# Patient Record
Sex: FEMALE | HISPANIC OR LATINO | URBAN - METROPOLITAN AREA
[De-identification: names, ages, dates, MRNs, and addresses within clinical notes are randomized per-mention and may not be internally consistent; named-entity substitution may affect disease eponyms.]

---

## 2017-08-25 ENCOUNTER — HOSPITAL ENCOUNTER (OUTPATIENT)
Dept: LAB | Facility: MEDICAL CENTER | Age: 27
End: 2017-08-25
Attending: FAMILY MEDICINE
Payer: COMMERCIAL

## 2017-08-25 LAB
BASOPHILS # BLD AUTO: 0.4 % (ref 0–1.8)
BASOPHILS # BLD: 0.03 K/UL (ref 0–0.12)
EOSINOPHIL # BLD AUTO: 0.35 K/UL (ref 0–0.51)
EOSINOPHIL NFR BLD: 5.2 % (ref 0–6.9)
ERYTHROCYTE [DISTWIDTH] IN BLOOD BY AUTOMATED COUNT: 40.1 FL (ref 35.9–50)
HCT VFR BLD AUTO: 38.7 % (ref 37–47)
HGB BLD-MCNC: 12.6 G/DL (ref 12–16)
IMM GRANULOCYTES # BLD AUTO: 0.02 K/UL (ref 0–0.11)
IMM GRANULOCYTES NFR BLD AUTO: 0.3 % (ref 0–0.9)
LYMPHOCYTES # BLD AUTO: 1.88 K/UL (ref 1–4.8)
LYMPHOCYTES NFR BLD: 28.1 % (ref 22–41)
MCH RBC QN AUTO: 27.6 PG (ref 27–33)
MCHC RBC AUTO-ENTMCNC: 32.6 G/DL (ref 33.6–35)
MCV RBC AUTO: 84.7 FL (ref 81.4–97.8)
MONOCYTES # BLD AUTO: 0.34 K/UL (ref 0–0.85)
MONOCYTES NFR BLD AUTO: 5.1 % (ref 0–13.4)
NEUTROPHILS # BLD AUTO: 4.06 K/UL (ref 2–7.15)
NEUTROPHILS NFR BLD: 60.9 % (ref 44–72)
NRBC # BLD AUTO: 0 K/UL
NRBC BLD AUTO-RTO: 0 /100 WBC
PLATELET # BLD AUTO: 269 K/UL (ref 164–446)
PMV BLD AUTO: 11.7 FL (ref 9–12.9)
RBC # BLD AUTO: 4.57 M/UL (ref 4.2–5.4)
T4 SERPL-MCNC: 8 UG/DL (ref 4–12)
TSH SERPL DL<=0.005 MIU/L-ACNC: 2.02 UIU/ML (ref 0.3–3.7)
WBC # BLD AUTO: 6.7 K/UL (ref 4.8–10.8)

## 2017-08-25 PROCEDURE — 84436 ASSAY OF TOTAL THYROXINE: CPT

## 2017-08-25 PROCEDURE — 36415 COLL VENOUS BLD VENIPUNCTURE: CPT

## 2017-08-25 PROCEDURE — 84443 ASSAY THYROID STIM HORMONE: CPT

## 2017-08-25 PROCEDURE — 85025 COMPLETE CBC W/AUTO DIFF WBC: CPT

## 2018-04-05 ENCOUNTER — OFFICE VISIT (OUTPATIENT)
Dept: URGENT CARE | Facility: CLINIC | Age: 28
End: 2018-04-05
Payer: COMMERCIAL

## 2018-04-05 VITALS
OXYGEN SATURATION: 99 % | HEART RATE: 88 BPM | WEIGHT: 136 LBS | TEMPERATURE: 98.6 F | BODY MASS INDEX: 25.03 KG/M2 | HEIGHT: 62 IN | SYSTOLIC BLOOD PRESSURE: 100 MMHG | DIASTOLIC BLOOD PRESSURE: 80 MMHG | RESPIRATION RATE: 16 BRPM

## 2018-04-05 DIAGNOSIS — J04.0 LARYNGITIS, ACUTE: ICD-10-CM

## 2018-04-05 PROCEDURE — 99203 OFFICE O/P NEW LOW 30 MIN: CPT | Performed by: PHYSICIAN ASSISTANT

## 2018-04-05 RX ORDER — METHYLPREDNISOLONE 4 MG/1
TABLET ORAL
Qty: 21 TAB | Refills: 0 | Status: ON HOLD | OUTPATIENT
Start: 2018-04-05 | End: 2018-11-12

## 2018-04-05 ASSESSMENT — ENCOUNTER SYMPTOMS
SWOLLEN GLANDS: 1
TROUBLE SWALLOWING: 1
SORE THROAT: 1
GASTROINTESTINAL NEGATIVE: 1
HOARSE VOICE: 1
SINUS PAIN: 0
MYALGIAS: 1
WHEEZING: 0
CONSTITUTIONAL NEGATIVE: 1
HEADACHES: 0
CARDIOVASCULAR NEGATIVE: 1
COUGH: 1
SHORTNESS OF BREATH: 0
NEUROLOGICAL NEGATIVE: 1

## 2018-04-05 ASSESSMENT — PATIENT HEALTH QUESTIONNAIRE - PHQ9: CLINICAL INTERPRETATION OF PHQ2 SCORE: 0

## 2018-04-05 NOTE — PROGRESS NOTES
"Subjective:      Kathleen Moe is a 28 y.o. female who presents with Pharyngitis (loss of voice, x2days, started with cold last week, runny nose, now just throat, mild cough)            Pharyngitis    This is a new problem. The current episode started in the past 7 days. The problem has been gradually worsening. There has been no fever. The fever has been present for less than 1 day. Associated symptoms include coughing, a hoarse voice, swollen glands and trouble swallowing. Pertinent negatives include no congestion, ear pain, headaches or shortness of breath. She has had no exposure to strep. Treatments tried: OTC cough and cold. The treatment provided mild relief.       PMH:  has a past medical history of Depression.  MEDS:   Current Outpatient Prescriptions:   •  Multiple Vitamin (MULTI-VITAMINS PO), Take 1 Tab by mouth every day., Disp: , Rfl:   •  sertraline (ZOLOFT) 50 MG TABS, Take 50 mg by mouth every day., Disp: , Rfl:   ALLERGIES: No Known Allergies  SURGHX: No past surgical history on file.  SOCHX:  reports that she has never smoked. She has never used smokeless tobacco. She reports that she does not drink alcohol or use drugs.  FH: family history is not on file.    Review of Systems   Constitutional: Negative.    HENT: Positive for hoarse voice, sore throat and trouble swallowing. Negative for congestion, ear pain and sinus pain.    Respiratory: Positive for cough. Negative for shortness of breath and wheezing.    Cardiovascular: Negative.    Gastrointestinal: Negative.    Musculoskeletal: Positive for myalgias. Negative for joint pain.   Neurological: Negative.  Negative for headaches.       Medications, Allergies, and current problem list reviewed today in Epic     Objective:     /80   Pulse 88   Temp 37 °C (98.6 °F)   Resp 16   Ht 1.575 m (5' 2\")   Wt 61.7 kg (136 lb)   SpO2 99%   Breastfeeding? No   BMI 24.87 kg/m²      Physical Exam   Constitutional: She is oriented to person, " place, and time. She appears well-developed and well-nourished. No distress.   HENT:   Head: Normocephalic and atraumatic.   Right Ear: Tympanic membrane and external ear normal.   Left Ear: Tympanic membrane and external ear normal.   Nose: Nose normal.   Mouth/Throat: Oropharynx is clear and moist. No oropharyngeal exudate.   Eyes: Conjunctivae are normal. Right eye exhibits no discharge. Left eye exhibits no discharge.   Neck: Normal range of motion. Neck supple.   Cardiovascular: Normal rate, regular rhythm and normal heart sounds.    Pulmonary/Chest: Effort normal and breath sounds normal. No respiratory distress. She has no wheezes.   Musculoskeletal: Normal range of motion.   Lymphadenopathy:     She has no cervical adenopathy.   Neurological: She is alert and oriented to person, place, and time.   Skin: Skin is warm and dry. She is not diaphoretic.   Psychiatric: She has a normal mood and affect. Her behavior is normal. Judgment and thought content normal.   Nursing note and vitals reviewed.              Assessment/Plan:     1. Laryngitis, acute  MethylPREDNISolone (MEDROL DOSEPAK) 4 MG Tablet Therapy Pack     Laryngitis secondary to viral infection. Vitals normal, exam unremarkable, no signs of bacterial infection  Medrol Dosepak  Voice rest  OTC meds and conservative measures as discussed  Return to clinic or go to ED if symptoms worsen or persist. Indications for ED discussed at length. Patient voices understanding. Follow-up with your primary care provider in 3-5 days. Red flags discussed. All side effects of medication discussed including allergic response, GI upset, tendon injury, etc.    Please note that this dictation was created using voice recognition software. I have made every reasonable attempt to correct obvious errors, but I expect that there are errors of grammar and possibly content that I did not discover before finalizing the note.

## 2018-04-05 NOTE — LETTER
April 5, 2018         Patient: Kathleen Moe   YOB: 1990   Date of Visit: 4/5/2018           To Whom it May Concern:    Kathleen Moe was seen in my clinic on 4/5/2018. She may return to work on Monday April 9th.    If you have any questions or concerns, please don't hesitate to call.        Sincerely,           Yves Maya P.A.-C.  Electronically Signed

## 2018-05-26 ENCOUNTER — APPOINTMENT (OUTPATIENT)
Dept: LAB | Facility: MEDICAL CENTER | Age: 28
End: 2018-05-26
Attending: OBSTETRICS & GYNECOLOGY
Payer: COMMERCIAL

## 2018-11-12 ENCOUNTER — HOSPITAL ENCOUNTER (INPATIENT)
Facility: MEDICAL CENTER | Age: 28
LOS: 2 days | End: 2018-11-14
Attending: OBSTETRICS & GYNECOLOGY | Admitting: OBSTETRICS & GYNECOLOGY
Payer: COMMERCIAL

## 2018-11-12 LAB
ALBUMIN SERPL BCP-MCNC: 3.4 G/DL (ref 3.2–4.9)
ALBUMIN/GLOB SERPL: 1.3 G/DL
ALP SERPL-CCNC: 134 U/L (ref 30–99)
ALT SERPL-CCNC: 12 U/L (ref 2–50)
ANION GAP SERPL CALC-SCNC: 9 MMOL/L (ref 0–11.9)
APPEARANCE UR: CLEAR
AST SERPL-CCNC: 17 U/L (ref 12–45)
BACTERIA #/AREA URNS HPF: NEGATIVE /HPF
BASOPHILS # BLD AUTO: 0.5 % (ref 0–1.8)
BASOPHILS # BLD: 0.04 K/UL (ref 0–0.12)
BILIRUB SERPL-MCNC: 0.6 MG/DL (ref 0.1–1.5)
BILIRUB UR QL STRIP.AUTO: NEGATIVE
BUN SERPL-MCNC: 5 MG/DL (ref 8–22)
CALCIUM SERPL-MCNC: 9.1 MG/DL (ref 8.5–10.5)
CHLORIDE SERPL-SCNC: 107 MMOL/L (ref 96–112)
CO2 SERPL-SCNC: 23 MMOL/L (ref 20–33)
COLOR UR: YELLOW
CREAT SERPL-MCNC: 0.5 MG/DL (ref 0.5–1.4)
CREAT UR-MCNC: 15.4 MG/DL
EOSINOPHIL # BLD AUTO: 0.04 K/UL (ref 0–0.51)
EOSINOPHIL NFR BLD: 0.5 % (ref 0–6.9)
EPI CELLS #/AREA URNS HPF: NEGATIVE /HPF
ERYTHROCYTE [DISTWIDTH] IN BLOOD BY AUTOMATED COUNT: 44.7 FL (ref 35.9–50)
GLOBULIN SER CALC-MCNC: 2.7 G/DL (ref 1.9–3.5)
GLUCOSE SERPL-MCNC: 83 MG/DL (ref 65–99)
GLUCOSE UR STRIP.AUTO-MCNC: NEGATIVE MG/DL
HCT VFR BLD AUTO: 35.2 % (ref 37–47)
HGB BLD-MCNC: 11.4 G/DL (ref 12–16)
HOLDING TUBE BB 8507: NORMAL
HYALINE CASTS #/AREA URNS LPF: NORMAL /LPF
IMM GRANULOCYTES # BLD AUTO: 0.03 K/UL (ref 0–0.11)
IMM GRANULOCYTES NFR BLD AUTO: 0.4 % (ref 0–0.9)
KETONES UR STRIP.AUTO-MCNC: NEGATIVE MG/DL
LEUKOCYTE ESTERASE UR QL STRIP.AUTO: ABNORMAL
LYMPHOCYTES # BLD AUTO: 2.07 K/UL (ref 1–4.8)
LYMPHOCYTES NFR BLD: 25.4 % (ref 22–41)
MCH RBC QN AUTO: 27.1 PG (ref 27–33)
MCHC RBC AUTO-ENTMCNC: 32.4 G/DL (ref 33.6–35)
MCV RBC AUTO: 83.8 FL (ref 81.4–97.8)
MICRO URNS: ABNORMAL
MONOCYTES # BLD AUTO: 0.47 K/UL (ref 0–0.85)
MONOCYTES NFR BLD AUTO: 5.8 % (ref 0–13.4)
NEUTROPHILS # BLD AUTO: 5.5 K/UL (ref 2–7.15)
NEUTROPHILS NFR BLD: 67.4 % (ref 44–72)
NITRITE UR QL STRIP.AUTO: NEGATIVE
NRBC # BLD AUTO: 0 K/UL
NRBC BLD-RTO: 0 /100 WBC
PH UR STRIP.AUTO: 8 [PH]
PLATELET # BLD AUTO: 216 K/UL (ref 164–446)
PMV BLD AUTO: 12.2 FL (ref 9–12.9)
POTASSIUM SERPL-SCNC: 3.7 MMOL/L (ref 3.6–5.5)
PROT SERPL-MCNC: 6.1 G/DL (ref 6–8.2)
PROT UR QL STRIP: NEGATIVE MG/DL
PROT UR-MCNC: 5.1 MG/DL (ref 0–15)
PROT/CREAT UR: 331 MG/G (ref 10–107)
RBC # BLD AUTO: 4.2 M/UL (ref 4.2–5.4)
RBC # URNS HPF: NORMAL /HPF
RBC UR QL AUTO: NEGATIVE
SODIUM SERPL-SCNC: 139 MMOL/L (ref 135–145)
SP GR UR STRIP.AUTO: 1.01
URATE SERPL-MCNC: 3.9 MG/DL (ref 1.9–8.2)
UROBILINOGEN UR STRIP.AUTO-MCNC: 0.2 MG/DL
WBC # BLD AUTO: 8.2 K/UL (ref 4.8–10.8)
WBC #/AREA URNS HPF: NORMAL /HPF

## 2018-11-12 PROCEDURE — 84156 ASSAY OF PROTEIN URINE: CPT

## 2018-11-12 PROCEDURE — 80053 COMPREHEN METABOLIC PANEL: CPT

## 2018-11-12 PROCEDURE — 700111 HCHG RX REV CODE 636 W/ 250 OVERRIDE (IP)

## 2018-11-12 PROCEDURE — 770002 HCHG ROOM/CARE - OB PRIVATE (112)

## 2018-11-12 PROCEDURE — 36415 COLL VENOUS BLD VENIPUNCTURE: CPT

## 2018-11-12 PROCEDURE — 10907ZC DRAINAGE OF AMNIOTIC FLUID, THERAPEUTIC FROM PRODUCTS OF CONCEPTION, VIA NATURAL OR ARTIFICIAL OPENING: ICD-10-PCS | Performed by: OBSTETRICS & GYNECOLOGY

## 2018-11-12 PROCEDURE — 700105 HCHG RX REV CODE 258: Performed by: OBSTETRICS & GYNECOLOGY

## 2018-11-12 PROCEDURE — 59409 OBSTETRICAL CARE: CPT

## 2018-11-12 PROCEDURE — 304965 HCHG RECOVERY SERVICES

## 2018-11-12 PROCEDURE — A9270 NON-COVERED ITEM OR SERVICE: HCPCS | Performed by: OBSTETRICS & GYNECOLOGY

## 2018-11-12 PROCEDURE — 85025 COMPLETE CBC W/AUTO DIFF WBC: CPT

## 2018-11-12 PROCEDURE — 700101 HCHG RX REV CODE 250

## 2018-11-12 PROCEDURE — 81001 URINALYSIS AUTO W/SCOPE: CPT

## 2018-11-12 PROCEDURE — 82570 ASSAY OF URINE CREATININE: CPT

## 2018-11-12 PROCEDURE — 700102 HCHG RX REV CODE 250 W/ 637 OVERRIDE(OP): Performed by: OBSTETRICS & GYNECOLOGY

## 2018-11-12 PROCEDURE — 700111 HCHG RX REV CODE 636 W/ 250 OVERRIDE (IP): Performed by: OBSTETRICS & GYNECOLOGY

## 2018-11-12 PROCEDURE — 3E0P7VZ INTRODUCTION OF HORMONE INTO FEMALE REPRODUCTIVE, VIA NATURAL OR ARTIFICIAL OPENING: ICD-10-PCS | Performed by: OBSTETRICS & GYNECOLOGY

## 2018-11-12 PROCEDURE — 84550 ASSAY OF BLOOD/URIC ACID: CPT

## 2018-11-12 RX ORDER — MISOPROSTOL 200 UG/1
800 TABLET ORAL
Status: DISCONTINUED | OUTPATIENT
Start: 2018-11-12 | End: 2018-11-13 | Stop reason: HOSPADM

## 2018-11-12 RX ORDER — CITRIC ACID/SODIUM CITRATE 334-500MG
30 SOLUTION, ORAL ORAL EVERY 6 HOURS PRN
Status: DISCONTINUED | OUTPATIENT
Start: 2018-11-12 | End: 2018-11-13 | Stop reason: HOSPADM

## 2018-11-12 RX ORDER — ONDANSETRON 2 MG/ML
4 INJECTION INTRAMUSCULAR; INTRAVENOUS EVERY 6 HOURS PRN
Status: DISCONTINUED | OUTPATIENT
Start: 2018-11-12 | End: 2018-11-14

## 2018-11-12 RX ORDER — HYDRALAZINE HYDROCHLORIDE 20 MG/ML
5-10 INJECTION INTRAMUSCULAR; INTRAVENOUS PRN
Status: DISCONTINUED | OUTPATIENT
Start: 2018-11-12 | End: 2018-11-14 | Stop reason: HOSPADM

## 2018-11-12 RX ORDER — ONDANSETRON 4 MG/1
4 TABLET, ORALLY DISINTEGRATING ORAL EVERY 6 HOURS PRN
Status: DISCONTINUED | OUTPATIENT
Start: 2018-11-12 | End: 2018-11-14

## 2018-11-12 RX ORDER — SODIUM CHLORIDE, SODIUM LACTATE, POTASSIUM CHLORIDE, AND CALCIUM CHLORIDE .6; .31; .03; .02 G/100ML; G/100ML; G/100ML; G/100ML
1000 INJECTION, SOLUTION INTRAVENOUS
Status: DISCONTINUED | OUTPATIENT
Start: 2018-11-12 | End: 2018-11-13 | Stop reason: HOSPADM

## 2018-11-12 RX ORDER — ROPIVACAINE HYDROCHLORIDE 2 MG/ML
INJECTION, SOLUTION EPIDURAL; INFILTRATION; PERINEURAL CONTINUOUS
Status: DISCONTINUED | OUTPATIENT
Start: 2018-11-12 | End: 2018-11-14 | Stop reason: HOSPADM

## 2018-11-12 RX ORDER — OXYCODONE HYDROCHLORIDE AND ACETAMINOPHEN 5; 325 MG/1; MG/1
2 TABLET ORAL EVERY 4 HOURS PRN
Status: DISCONTINUED | OUTPATIENT
Start: 2018-11-12 | End: 2018-11-14 | Stop reason: HOSPADM

## 2018-11-12 RX ORDER — DEXTROSE, SODIUM CHLORIDE, SODIUM LACTATE, POTASSIUM CHLORIDE, AND CALCIUM CHLORIDE 5; .6; .31; .03; .02 G/100ML; G/100ML; G/100ML; G/100ML; G/100ML
INJECTION, SOLUTION INTRAVENOUS CONTINUOUS
Status: DISCONTINUED | OUTPATIENT
Start: 2018-11-12 | End: 2018-11-14 | Stop reason: HOSPADM

## 2018-11-12 RX ORDER — LABETALOL HYDROCHLORIDE 5 MG/ML
20 INJECTION, SOLUTION INTRAVENOUS ONCE
Status: COMPLETED | OUTPATIENT
Start: 2018-11-12 | End: 2018-11-12

## 2018-11-12 RX ORDER — BUPIVACAINE HYDROCHLORIDE 2.5 MG/ML
INJECTION, SOLUTION EPIDURAL; INFILTRATION; INTRACAUDAL
Status: ACTIVE
Start: 2018-11-12 | End: 2018-11-13

## 2018-11-12 RX ORDER — LABETALOL HYDROCHLORIDE 5 MG/ML
20-80 INJECTION, SOLUTION INTRAVENOUS PRN
Status: DISCONTINUED | OUTPATIENT
Start: 2018-11-12 | End: 2018-11-14 | Stop reason: HOSPADM

## 2018-11-12 RX ORDER — SODIUM CHLORIDE, SODIUM LACTATE, POTASSIUM CHLORIDE, AND CALCIUM CHLORIDE .6; .31; .03; .02 G/100ML; G/100ML; G/100ML; G/100ML
250 INJECTION, SOLUTION INTRAVENOUS PRN
Status: DISCONTINUED | OUTPATIENT
Start: 2018-11-12 | End: 2018-11-13 | Stop reason: HOSPADM

## 2018-11-12 RX ORDER — ROPIVACAINE HYDROCHLORIDE 2 MG/ML
INJECTION, SOLUTION EPIDURAL; INFILTRATION; PERINEURAL
Status: COMPLETED
Start: 2018-11-12 | End: 2018-11-12

## 2018-11-12 RX ORDER — OXYCODONE HYDROCHLORIDE AND ACETAMINOPHEN 5; 325 MG/1; MG/1
1 TABLET ORAL EVERY 4 HOURS PRN
Status: DISCONTINUED | OUTPATIENT
Start: 2018-11-12 | End: 2018-11-14 | Stop reason: HOSPADM

## 2018-11-12 RX ORDER — BETAMETHASONE SODIUM PHOSPHATE AND BETAMETHASONE ACETATE 3; 3 MG/ML; MG/ML
12 INJECTION, SUSPENSION INTRA-ARTICULAR; INTRALESIONAL; INTRAMUSCULAR; SOFT TISSUE ONCE
Status: COMPLETED | OUTPATIENT
Start: 2018-11-12 | End: 2018-11-12

## 2018-11-12 RX ORDER — SODIUM CHLORIDE, SODIUM LACTATE, POTASSIUM CHLORIDE, CALCIUM CHLORIDE 600; 310; 30; 20 MG/100ML; MG/100ML; MG/100ML; MG/100ML
INJECTION, SOLUTION INTRAVENOUS CONTINUOUS
Status: DISPENSED | OUTPATIENT
Start: 2018-11-12 | End: 2018-11-12

## 2018-11-12 RX ORDER — ALUMINA, MAGNESIA, AND SIMETHICONE 2400; 2400; 240 MG/30ML; MG/30ML; MG/30ML
30 SUSPENSION ORAL EVERY 6 HOURS PRN
Status: DISCONTINUED | OUTPATIENT
Start: 2018-11-12 | End: 2018-11-13 | Stop reason: HOSPADM

## 2018-11-12 RX ORDER — ACETAMINOPHEN 325 MG/1
650 TABLET ORAL EVERY 4 HOURS PRN
Status: DISCONTINUED | OUTPATIENT
Start: 2018-11-12 | End: 2018-11-13 | Stop reason: HOSPADM

## 2018-11-12 RX ORDER — IBUPROFEN 600 MG/1
600 TABLET ORAL EVERY 6 HOURS PRN
Status: DISCONTINUED | OUTPATIENT
Start: 2018-11-12 | End: 2018-11-14 | Stop reason: HOSPADM

## 2018-11-12 RX ADMIN — ROPIVACAINE HYDROCHLORIDE: 2 INJECTION, SOLUTION EPIDURAL; INFILTRATION; PERINEURAL at 21:22

## 2018-11-12 RX ADMIN — Medication 2 MILLI-UNITS/MIN: at 15:30

## 2018-11-12 RX ADMIN — Medication 125 ML/HR: at 23:52

## 2018-11-12 RX ADMIN — SODIUM CHLORIDE 2.5 MILLION UNITS: 9 INJECTION, SOLUTION INTRAVENOUS at 19:23

## 2018-11-12 RX ADMIN — SODIUM CHLORIDE 2.5 MILLION UNITS: 9 INJECTION, SOLUTION INTRAVENOUS at 15:29

## 2018-11-12 RX ADMIN — SODIUM CHLORIDE, POTASSIUM CHLORIDE, SODIUM LACTATE AND CALCIUM CHLORIDE: 600; 310; 30; 20 INJECTION, SOLUTION INTRAVENOUS at 11:18

## 2018-11-12 RX ADMIN — SODIUM CHLORIDE, SODIUM LACTATE, POTASSIUM CHLORIDE, CALCIUM CHLORIDE AND DEXTROSE MONOHYDRATE: 5; 600; 310; 30; 20 INJECTION, SOLUTION INTRAVENOUS at 21:04

## 2018-11-12 RX ADMIN — MISOPROSTOL 25 MCG: 100 TABLET ORAL at 12:07

## 2018-11-12 RX ADMIN — SODIUM CHLORIDE, SODIUM LACTATE, POTASSIUM CHLORIDE, CALCIUM CHLORIDE AND DEXTROSE MONOHYDRATE: 5; 600; 310; 30; 20 INJECTION, SOLUTION INTRAVENOUS at 19:23

## 2018-11-12 RX ADMIN — SODIUM CHLORIDE 5 MILLION UNITS: 900 INJECTION INTRAVENOUS at 11:15

## 2018-11-12 RX ADMIN — HYDRALAZINE HYDROCHLORIDE 5 MG: 20 INJECTION INTRAMUSCULAR; INTRAVENOUS at 11:28

## 2018-11-12 RX ADMIN — ACETAMINOPHEN 650 MG: 325 TABLET, FILM COATED ORAL at 18:34

## 2018-11-12 RX ADMIN — BETAMETHASONE SODIUM PHOSPHATE AND BETAMETHASONE ACETATE 12 MG: 3; 3 INJECTION, SUSPENSION INTRA-ARTICULAR; INTRALESIONAL; INTRAMUSCULAR at 11:18

## 2018-11-12 RX ADMIN — SODIUM CHLORIDE, POTASSIUM CHLORIDE, SODIUM LACTATE AND CALCIUM CHLORIDE: 600; 310; 30; 20 INJECTION, SOLUTION INTRAVENOUS at 21:39

## 2018-11-12 RX ADMIN — SODIUM CHLORIDE, POTASSIUM CHLORIDE, SODIUM LACTATE AND CALCIUM CHLORIDE: 600; 310; 30; 20 INJECTION, SOLUTION INTRAVENOUS at 20:35

## 2018-11-12 ASSESSMENT — COPD QUESTIONNAIRES
DURING THE PAST 4 WEEKS HOW MUCH DID YOU FEEL SHORT OF BREATH: NONE/LITTLE OF THE TIME
HAVE YOU SMOKED AT LEAST 100 CIGARETTES IN YOUR ENTIRE LIFE: NO/DON'T KNOW
COPD SCREENING SCORE: 0
IN THE PAST 12 MONTHS DO YOU DO LESS THAN YOU USED TO BECAUSE OF YOUR BREATHING PROBLEMS: DISAGREE/UNSURE
DO YOU EVER COUGH UP ANY MUCUS OR PHLEGM?: NO/ONLY WITH OCCASIONAL COLDS OR INFECTIONS

## 2018-11-12 ASSESSMENT — PAIN SCALES - GENERAL
PAINLEVEL_OUTOF10: 0
PAINLEVEL_OUTOF10: 0

## 2018-11-12 ASSESSMENT — PATIENT HEALTH QUESTIONNAIRE - PHQ9
SUM OF ALL RESPONSES TO PHQ9 QUESTIONS 1 AND 2: 0
2. FEELING DOWN, DEPRESSED, IRRITABLE, OR HOPELESS: NOT AT ALL
SUM OF ALL RESPONSES TO PHQ9 QUESTIONS 1 AND 2: 0
2. FEELING DOWN, DEPRESSED, IRRITABLE, OR HOPELESS: NOT AT ALL
1. LITTLE INTEREST OR PLEASURE IN DOING THINGS: NOT AT ALL
1. LITTLE INTEREST OR PLEASURE IN DOING THINGS: NOT AT ALL

## 2018-11-12 ASSESSMENT — LIFESTYLE VARIABLES
ALCOHOL_USE: NO
EVER_SMOKED: NEVER

## 2018-11-12 NOTE — PROGRESS NOTES
1100: Assumed care of pt. AAO, pt denies pain or UC, POC discussed, pt in wheelchair to labor room.   1813: Dr. Ghosh to bedside for AROM of clear fluid, pt tolerated well  1900: Report given to Luis RN, pt in stable condition

## 2018-11-12 NOTE — PROGRESS NOTES
Labor Progress Note    Kathleen Moe   IUP at 36.1 weeks      Subjective:  Pt sent from the office for elevated bp and pedal edema. Pt without pih sx.  Uterine contractions:no  Pain: No    Objective:   Vitals:    11/12/18 1017 11/12/18 1034 11/12/18 1103 11/12/18 1152   BP: (!) 161/90 (!) 171/92 159/103 158/87   Pulse: 80 71 76 75   Weight:       Height:         Fetal heart variability:140's category 1  Orin: irregular  SVE: 1/50/high  Cytotec 25 mcg at 12:14 pm    Membranes ruptured: .no    Meds:   Epidural : .no  Pitocin: .no  S/p Penicillin G loading dose for GBBS carrier  S/p hydralazine 5 mg iv, once  S/p betamethasone 12 mg im x 1    Labs:  Recent Results (from the past 24 hour(s))   CBC WITH DIFFERENTIAL    Collection Time: 11/12/18 10:20 AM   Result Value Ref Range    WBC 8.2 4.8 - 10.8 K/uL    RBC 4.20 4.20 - 5.40 M/uL    Hemoglobin 11.4 (L) 12.0 - 16.0 g/dL    Hematocrit 35.2 (L) 37.0 - 47.0 %    MCV 83.8 81.4 - 97.8 fL    MCH 27.1 27.0 - 33.0 pg    MCHC 32.4 (L) 33.6 - 35.0 g/dL    RDW 44.7 35.9 - 50.0 fL    Platelet Count 216 164 - 446 K/uL    MPV 12.2 9.0 - 12.9 fL    Neutrophils-Polys 67.40 44.00 - 72.00 %    Lymphocytes 25.40 22.00 - 41.00 %    Monocytes 5.80 0.00 - 13.40 %    Eosinophils 0.50 0.00 - 6.90 %    Basophils 0.50 0.00 - 1.80 %    Immature Granulocytes 0.40 0.00 - 0.90 %    Nucleated RBC 0.00 /100 WBC    Neutrophils (Absolute) 5.50 2.00 - 7.15 K/uL    Lymphs (Absolute) 2.07 1.00 - 4.80 K/uL    Monos (Absolute) 0.47 0.00 - 0.85 K/uL    Eos (Absolute) 0.04 0.00 - 0.51 K/uL    Baso (Absolute) 0.04 0.00 - 0.12 K/uL    Immature Granulocytes (abs) 0.03 0.00 - 0.11 K/uL    NRBC (Absolute) 0.00 K/uL   COMP METABOLIC PANEL    Collection Time: 11/12/18 10:20 AM   Result Value Ref Range    Sodium 139 135 - 145 mmol/L    Potassium 3.7 3.6 - 5.5 mmol/L    Chloride 107 96 - 112 mmol/L    Co2 23 20 - 33 mmol/L    Anion Gap 9.0 0.0 - 11.9    Glucose 83 65 - 99 mg/dL    Bun 5 (L) 8 - 22 mg/dL     Creatinine 0.50 0.50 - 1.40 mg/dL    Calcium 9.1 8.5 - 10.5 mg/dL    AST(SGOT) 17 12 - 45 U/L    ALT(SGPT) 12 2 - 50 U/L    Alkaline Phosphatase 134 (H) 30 - 99 U/L    Total Bilirubin 0.6 0.1 - 1.5 mg/dL    Albumin 3.4 3.2 - 4.9 g/dL    Total Protein 6.1 6.0 - 8.2 g/dL    Globulin 2.7 1.9 - 3.5 g/dL    A-G Ratio 1.3 g/dL   URIC ACID    Collection Time: 18 10:20 AM   Result Value Ref Range    Uric Acid 3.9 1.9 - 8.2 mg/dL   URINALYSIS    Collection Time: 18 10:20 AM   Result Value Ref Range    Color Yellow     Character Clear     Specific Gravity 1.006 <1.035    Ph 8.0 5.0 - 8.0    Glucose Negative Negative mg/dL    Ketones Negative Negative mg/dL    Protein Negative Negative mg/dL    Bilirubin Negative Negative    Urobilinogen, Urine 0.2 Negative    Nitrite Negative Negative    Leukocyte Esterase Trace (A) Negative    Occult Blood Negative Negative    Micro Urine Req Microscopic    PROTEIN/CREAT RATIO URINE    Collection Time: 18 10:20 AM   Result Value Ref Range    Total Protein, Urine 5.1 0.0 - 15.0 mg/dL    Creatinine, Random Urine 15.40 mg/dL    Protein Creatinine Ratio 331 (H) 10 - 107 mg/g   URINE MICROSCOPIC (W/UA)    Collection Time: 18 10:20 AM   Result Value Ref Range    WBC 0-2 /hpf    RBC 0-2 /hpf    Bacteria Negative None /hpf    Epithelial Cells Negative /hpf    Hyaline Cast 0-2 /lpf   ESTIMATED GFR    Collection Time: 18 10:20 AM   Result Value Ref Range    GFR If African American >60 >60 mL/min/1.73 m 2    GFR If Non African American >60 >60 mL/min/1.73 m 2       Assessment:   IUP at 36.1/gestational htn- pt admitted. Proceeding with IOL for elevated bp. Pt with normal PIH labs. One dose of betamethasone 10mg im given for Fetal lung maturity. S/p cytotec 25 mcg per vagina and will recheck in 4 hrs. CPM. Expt   GBBS positive- pt afebrile. S/p loading dose of Penicillin per gbbs protocol  Fetal status-reassuring.         Ban Ghosh

## 2018-11-13 LAB
ERYTHROCYTE [DISTWIDTH] IN BLOOD BY AUTOMATED COUNT: 45.5 FL (ref 35.9–50)
HCT VFR BLD AUTO: 31.9 % (ref 37–47)
HGB BLD-MCNC: 10.3 G/DL (ref 12–16)
MCH RBC QN AUTO: 27.2 PG (ref 27–33)
MCHC RBC AUTO-ENTMCNC: 32.3 G/DL (ref 33.6–35)
MCV RBC AUTO: 84.4 FL (ref 81.4–97.8)
PLATELET # BLD AUTO: 193 K/UL (ref 164–446)
PMV BLD AUTO: 12.3 FL (ref 9–12.9)
RBC # BLD AUTO: 3.78 M/UL (ref 4.2–5.4)
WBC # BLD AUTO: 15.5 K/UL (ref 4.8–10.8)

## 2018-11-13 PROCEDURE — 700112 HCHG RX REV CODE 229: Performed by: OBSTETRICS & GYNECOLOGY

## 2018-11-13 PROCEDURE — 85027 COMPLETE CBC AUTOMATED: CPT

## 2018-11-13 PROCEDURE — A9270 NON-COVERED ITEM OR SERVICE: HCPCS | Performed by: OBSTETRICS & GYNECOLOGY

## 2018-11-13 PROCEDURE — 770002 HCHG ROOM/CARE - OB PRIVATE (112)

## 2018-11-13 PROCEDURE — 36415 COLL VENOUS BLD VENIPUNCTURE: CPT

## 2018-11-13 PROCEDURE — 700102 HCHG RX REV CODE 250 W/ 637 OVERRIDE(OP): Performed by: OBSTETRICS & GYNECOLOGY

## 2018-11-13 RX ORDER — VITAMIN A ACETATE, BETA CAROTENE, ASCORBIC ACID, CHOLECALCIFEROL, .ALPHA.-TOCOPHEROL ACETATE, DL-, THIAMINE MONONITRATE, RIBOFLAVIN, NIACINAMIDE, PYRIDOXINE HYDROCHLORIDE, FOLIC ACID, CYANOCOBALAMIN, CALCIUM CARBONATE, FERROUS FUMARATE, ZINC OXIDE, CUPRIC OXIDE 3080; 12; 120; 400; 1; 1.84; 3; 20; 22; 920; 25; 200; 27; 10; 2 [IU]/1; UG/1; MG/1; [IU]/1; MG/1; MG/1; MG/1; MG/1; MG/1; [IU]/1; MG/1; MG/1; MG/1; MG/1; MG/1
1 TABLET, FILM COATED ORAL EVERY MORNING
Status: DISCONTINUED | OUTPATIENT
Start: 2018-11-13 | End: 2018-11-14 | Stop reason: HOSPADM

## 2018-11-13 RX ORDER — DOCUSATE SODIUM 100 MG/1
100 CAPSULE, LIQUID FILLED ORAL 2 TIMES DAILY PRN
Status: DISCONTINUED | OUTPATIENT
Start: 2018-11-13 | End: 2018-11-14 | Stop reason: HOSPADM

## 2018-11-13 RX ORDER — SODIUM CHLORIDE, SODIUM LACTATE, POTASSIUM CHLORIDE, CALCIUM CHLORIDE 600; 310; 30; 20 MG/100ML; MG/100ML; MG/100ML; MG/100ML
INJECTION, SOLUTION INTRAVENOUS PRN
Status: DISCONTINUED | OUTPATIENT
Start: 2018-11-13 | End: 2018-11-14 | Stop reason: HOSPADM

## 2018-11-13 RX ORDER — MISOPROSTOL 200 UG/1
800 TABLET ORAL
Status: DISCONTINUED | OUTPATIENT
Start: 2018-11-13 | End: 2018-11-14 | Stop reason: HOSPADM

## 2018-11-13 RX ADMIN — Medication 1 TABLET: at 06:34

## 2018-11-13 RX ADMIN — OXYCODONE AND ACETAMINOPHEN 1 TABLET: 5; 325 TABLET ORAL at 14:39

## 2018-11-13 RX ADMIN — IBUPROFEN 600 MG: 600 TABLET, FILM COATED ORAL at 20:41

## 2018-11-13 RX ADMIN — IBUPROFEN 600 MG: 600 TABLET, FILM COATED ORAL at 06:34

## 2018-11-13 RX ADMIN — IBUPROFEN 600 MG: 600 TABLET, FILM COATED ORAL at 14:38

## 2018-11-13 RX ADMIN — DOCUSATE SODIUM 100 MG: 100 CAPSULE, LIQUID FILLED ORAL at 06:33

## 2018-11-13 ASSESSMENT — PAIN SCALES - GENERAL
PAINLEVEL_OUTOF10: 5
PAINLEVEL_OUTOF10: 6
PAINLEVEL_OUTOF10: 0
PAINLEVEL_OUTOF10: 3
PAINLEVEL_OUTOF10: 5

## 2018-11-13 ASSESSMENT — PATIENT HEALTH QUESTIONNAIRE - PHQ9
1. LITTLE INTEREST OR PLEASURE IN DOING THINGS: NOT AT ALL
SUM OF ALL RESPONSES TO PHQ9 QUESTIONS 1 AND 2: 0
2. FEELING DOWN, DEPRESSED, IRRITABLE, OR HOPELESS: NOT AT ALL

## 2018-11-13 NOTE — CARE PLAN
Problem: Infection  Goal: Will remain free from infection    Intervention: Implement standard precautions and perform hand washing before and after patient contact  Hand hygiene performed appropriately, education provided       Problem: Pain  Goal: Alleviation of Pain or a reduction in pain to the patient's comfort goal    Intervention: Initial pain assessment  Educated pt regarding pain management techniques both pharmacologic and non-pharmacologic. Pt stated understanding, stated she will let RN know if intervention is needed.

## 2018-11-13 NOTE — PROGRESS NOTES
0130: Pt to room S330 via wheelchair, transferred to bed independently with steady gait. Received bedside report from PHILIPPE Smith.    Fundus firm and lochia light, Pitocin infusing at 125 mL/hr. Pt declines pain medication at this time.     Infant in NBN for resp distress, pt taken to see infant and updated on infant's status. RN will set pt up with breast pump. Given AWHONN LPI handout.     Oriented to room, call light, emergency lights, and Skylight system. Questions answered, call light within reach.

## 2018-11-13 NOTE — L&D DELIVERY NOTE
DATE OF SERVICE:  2018    PROCEDURES:  1.  Induction of labor.  2.  Epidural anesthesia.  3.  Spontaneous vaginal delivery.    OBSTETRICIAN:  Jose F Tim MD    DIAGNOSES:  1.  A 36-1/7 week gestation.  2.  Gestational hypertension.  3.  Induced labor.  4.  Group B streptococcus positive.    COMPLICATIONS:  None.    ESTIMATED BLOOD LOSS:  200 mL    BABY:  Male, one-minute Apgar 8 and five-minute Apgar 9.  Baby has not been   weighed yet.    DELIVERY NOTE:  This 28-year-old lady is now G3, P3.  She was admitted because   of gestational hypertension, initially diagnosed at her routine prenatal   appointment, and persistent after her arrival on labor and delivery.  Her   urine protein to creatinine ratio was 331.  Platelet count and  liver enzymes were normal.She was given 1 dose of IV   hydralazine on admission, which worked quite well.  She never required   anymore antihypertensives.  Her labor was induced with   a single dose of intravaginal misoprostol followed by oxytocin.  She received   epidural anesthesia.  She received 3 doses of IV ampicillin because of a   positive antepartum group B strep culture, and she remained afebrile   throughout.  Amniotomy at 2 cm dilatation produced clear fluid.  Delivery   occurred approximately 4 hours post-amniotomy.  Second stage was very short.    She only needed to push 1 contraction.  She pushed the baby's head out occiput   anterior in a controlled fashion with no episiotomy.  I bulb suctioned the   baby's mouth and nares.  There were no nuchal cords.  The shoulders and body   delivered easily.  I placed the baby on her chest and 3 minutes later, I   doubly clamped and cut the cord.  The placenta and all attached membranes   delivered spontaneously.  There was a 3-vessel cord with central insertion.    There were no lacerations.  Blood loss was minimal.  Recent blood pressures   have been 130s-140s systolic, 60s-80s diastolic.        ____________________________________     MD FANTA ACOSTA / CIRO    DD:  11/12/2018 23:01:12  DT:  11/13/2018 00:37:47    D#:  [id number]  Job#:  [id number]    cc: LIVIA HARRELL MD

## 2018-11-13 NOTE — LACTATION NOTE
Initial visit with mother. She reports she breastfeed her 2 other boys who are 7 and 10 yrs old.  Discussed how the breasts make milk, and importance of regular pumping and feeding to maximize milk supply. Baby placed skin to skin with MOB in laid back position. Discussed hunger cues and helping baby to breast when cues are noted. MOB prefers similac or supplementation.  New Beginnnings booklet reviewed with MOB.    Plan:   1 hour skin to skin then supplement if no latch obtained.   Pump 8X every 24 hours with HG pump.   Supplement with similac due to LPI status

## 2018-11-13 NOTE — PROGRESS NOTES
: report received from Brandi Parker RN. POC discussed, care assumed. Pt educated regarding pain management techniques, both pharmacologic and non-pharmacologic. Pt stated understanding, pt will let RN know if intervention needed.   : pt requesting epidural  : fluids hung and opened in preparation for epidural, anesthesia, Dr. Gansert called, updated regarding pt want for an epidural and BP's. Per anesthesia give only one bag of fluid.   : epidural placed  : deep variables occurring, HR down to 70's x's 40 sec  : pt turned to left side, fluids opened  : pitocin off, oxygen applied. Pt turned to right side  : SVE: 7/80/-1  : lindsey placed  : pt turned to left side  : SVE: 7-8/100/0   220: pt turned to right side, pt continuing to have lates and variables  : Dr. Tim called, updated regarding pt strip, variables, lates and cervical change. Requested MD to review strip, strip reviewed. No further orders at this time.   0: pt turned to left side  2224: prolong decel HR down to 75 x's 160 sec, Fluids still open, oxygen still on, pt turned to right side   2227: SVE: complete/+1, Dr. Tim at bedside, reviewed strip, updated regarding pt status, pt prepared for delivery  2228: pt began pushing   2237: : viable baby boy, apgar's 8/9  2241: delivery of placenta    Recovery: Fundus/ firm/ light/ at U, 0100:pt up to bathroom, voided, kristin care performed, gown changed. 0105: pt transferred to postpartum, Report given to Natasha PAEZ.

## 2018-11-13 NOTE — H&P
CHIEF COMPLAINT:  Elevated blood pressures ranging from 150-170/ and   pregnancy at 36 weeks and 1 day.    HISTORY OF PRESENT ILLNESS:  This patient is a 28-year-old  3, para 2   with a last menstrual period of 2018 at 36 weeks and 1 day based on last   menstrual period and ultrasound at 9 weeks.  Patient's prenatal care has been   uncomplicated.  She came in today for her routine prenatal care and was   complaining of pedal edema.  She was found to have 1+ pedal edema and her   blood pressure was elevated at 144/92 and 156/94.  Therefore, she was sent to   labor and delivery to rule out preeclampsia.  In labor and delivery, her blood   pressures were even higher ranging from 150-170/90s-100.  PIH labs were   ordered and they were normal except for elevated protein creatinine ratio of 331.  Patient was given hydralazine 5 mg IV and then her blood pressures improved to 150s/90s.  Patient has a favorable cervix at 1 cm and because she is 36 weeks with gestational hypertension, I discussed with the patient the need to proceed with induction of labor.  She was given 1 dose of betamethasone 12 mg IM and was started on Cytotec.    PAST MEDICAL HISTORY:  Gestational hypertension.    PAST SURGICAL HISTORY:  None.    MEDICINES:  Prenatal vitamins and now status post hydralazine 5 mg IV, status   post betamethasone 12 mg IM x1 and status post penicillin G per GBS protocol.    ALLERGIES:  No known drug allergies.    OBSTETRICAL HISTORY:  She is a  3, para 2.  On 2005, she had a   male, normal spontaneous vaginal delivery at 40 weeks.  Her second pregnancy   was on 2010, male, normal spontaneous vaginal delivery at 40 weeks.    GYNECOLOGIC HISTORY:  Patient started menstruating at age 11, has a menstrual   cycle every 28 days, last about 5 days.  No history of STDs.  No history of   HSV.  Her last Pap smear was on 2018 and was negative Pap, negative   chlamydia, and negative  gonorrhea.    SOCIAL HISTORY:  Patient is .  Denies tobacco, alcohol or drug use.    FAMILY HISTORY:  Father with high cholesterol, otherwise no genetic problems.    PHYSICAL EXAMINATION:  VITAL SIGNS:  Blood pressures now improved and now currently they are   140s-150s/90s.  GENERAL:  Pleasant female in no acute distress.  LUNGS:  Clear to auscultation bilaterally.  CARDIOVASCULAR:  Regular rate and rhythm.  No murmur.  ABDOMEN:  Soft, gravid.  Leopold's to 6 pounds.  EXTREMITIES:  1+ edema, no clonus.  PELVIC:  Fetal heart tones 130s, category 1.  Graham jing every 1-3   minutes.  GENITOURINARY:  Sterile vaginal exam, 1 cm dilated, 75% effaced, -3 station,   status post Cytotec 25 mcg per vagina and now 4 hours later, the patient is on   Pitocin.    PRENATAL LABS:  Group B strep is positive.  One-hour glucose 115.  H and H at   28 weeks 11.8 and 36.0.  Platelets 236.  Quadruple screen was negative.    Patient is A positive, antibody screen negative, RPR nonreactive, hepatitis B   surface antigen nonreactive, rubella nonimmune, hepatitis C negative, HIV   negative, hepatitis C negative, Pap smear negative, chlamydia and gonorrhea   Negative. Preeclampsia labs are normal except for elevated protein creatinine ratio of 331.    ASSESSMENT AND PLAN:  A 28-year-old  3, para 2 at 36 weeks and 1 day   with a due date of 12/10/2018 based on last menstrual period and ultrasound.  1.  Gestational hypertension.  Patient with elevated blood pressures and pedal   edema.  Her PIH labs are normal except for elevated protein creatinine ratio of 331. .  I have discussed with patient the need for induction of labor and patient is in agreement with the plan.  She did have one dose of hydralazine 5 mg IV and her blood pressures have improved.  At this time, she has one dose of Cytotec 25 mcg per vagina and now 4 hours later, she is on Pitocin.  We will perform artificial rupture of membranes when the patient has 2  or more cm.  We will expect a normal spontaneous vaginal delivery.  Patient is aware that the baby is only 36 weeks and   therefore, there is a potential risk of the baby needing to go to the   intensive care nursery because of potential problems keeping temperature, poor   feeding or rare risk of respiratory distress syndrome.  The patient was given   12 mg IM betamethasone.  2.  GBS positive, patient is now status post her second dose of penicillin G,   and will perform artificial rupture of membranes.  3.  Fetal status reassuring.       ____________________________________     MD ABRAHAM CORADO / NTS    DD:  11/12/2018 16:30:17  DT:  11/12/2018 17:05:48    D#:  [id number]  Job#:  [id number]

## 2018-11-13 NOTE — PROGRESS NOTES
Baby:  Male, APGARs 8-9, not weighed yet  Plac. Spont,  cc  No epis, no lac  2nd stage very short    3 doses ampicillin, afebrile  Pt. Never required any antihypertensives other than initial hydralazine, BP 130s-140s/60s-80s  epidural

## 2018-11-13 NOTE — PROGRESS NOTES
Pt doubled pumped breasts for 15 minutes with Ameda pump, with settings of 25% suction and 80 CPM down to 60 CMP at 2 minutes.     Educated on pump settings, frequency, and cleaning parts. Pt states she has Medela pump at home. Will consult with lactation RN.

## 2018-11-13 NOTE — CARE PLAN
Problem: Safety  Goal: Will remain free from falls  Outcome: PROGRESSING AS EXPECTED  Fall precautions in place: treaded slipper socks on, mobility signs posted, hourly rounding, bed in lowest position, belongings and call light are within reach    Problem: Alteration in comfort related to episiotomy, vaginal repair and/or after birth pains  Goal: Patient is able to ambulate, care for self and infant  Outcome: PROGRESSING AS EXPECTED  Pain managed well with PRN medication at this time. Patient is able to ambulate, care for self and infant. Patient is able to make needs known.

## 2018-11-13 NOTE — CARE PLAN
Problem: Altered physiologic condition related to immediate post-delivery state and potential for bleeding/hemorrhage  Goal: Patient physiologically stable as evidenced by normal lochia, palpable uterine involution and vital signs within normal limits  Outcome: PROGRESSING AS EXPECTED  Fundus firm, lochia light, vitals stable. Second bag of Pitocin infusing at 125 mL/hr.     Problem: Alteration in comfort related to episiotomy, vaginal repair and/or after birth pains  Goal: Patient is able to ambulate, care for self and infant  Outcome: PROGRESSING AS EXPECTED  Pr declines pain medication at this time, educated to call RN for PRN pain meds as needed. Pain assessed q2-4 hours.

## 2018-11-13 NOTE — PROGRESS NOTES
Labor Progress Note    Kathleen Moe   IUP at 36.1/gestational htn      Subjective:  Pt feeling contractions but not ready for epidural. Pt with a mild frontal headache. No visual complaints.  Uterine contractions:yes  Pain: Yes. Severity: mild    Objective:   Vitals:    11/12/18 1652 11/12/18 1713 11/12/18 1733 11/12/18 1754   BP: 136/80 126/64 145/83 142/88   Pulse: 96 83 76 85   Weight:       Height:         Fetal heart variability:130's category 1 no decels  [place]: q 2  SVE: 2/50/-2, arom, clear  Membranes ruptured: .yes, clear    Meds:   Epidural : .no  Pitocin: .yes, 6 mu  S/p betamethasone 12 mg im x 1  S/p 2nd dose of Penicillin G for GBBS    S/p hydralazine 5mg iv x 1    Labs:  Recent Results (from the past 24 hour(s))   CBC WITH DIFFERENTIAL    Collection Time: 11/12/18 10:20 AM   Result Value Ref Range    WBC 8.2 4.8 - 10.8 K/uL    RBC 4.20 4.20 - 5.40 M/uL    Hemoglobin 11.4 (L) 12.0 - 16.0 g/dL    Hematocrit 35.2 (L) 37.0 - 47.0 %    MCV 83.8 81.4 - 97.8 fL    MCH 27.1 27.0 - 33.0 pg    MCHC 32.4 (L) 33.6 - 35.0 g/dL    RDW 44.7 35.9 - 50.0 fL    Platelet Count 216 164 - 446 K/uL    MPV 12.2 9.0 - 12.9 fL    Neutrophils-Polys 67.40 44.00 - 72.00 %    Lymphocytes 25.40 22.00 - 41.00 %    Monocytes 5.80 0.00 - 13.40 %    Eosinophils 0.50 0.00 - 6.90 %    Basophils 0.50 0.00 - 1.80 %    Immature Granulocytes 0.40 0.00 - 0.90 %    Nucleated RBC 0.00 /100 WBC    Neutrophils (Absolute) 5.50 2.00 - 7.15 K/uL    Lymphs (Absolute) 2.07 1.00 - 4.80 K/uL    Monos (Absolute) 0.47 0.00 - 0.85 K/uL    Eos (Absolute) 0.04 0.00 - 0.51 K/uL    Baso (Absolute) 0.04 0.00 - 0.12 K/uL    Immature Granulocytes (abs) 0.03 0.00 - 0.11 K/uL    NRBC (Absolute) 0.00 K/uL   COMP METABOLIC PANEL    Collection Time: 11/12/18 10:20 AM   Result Value Ref Range    Sodium 139 135 - 145 mmol/L    Potassium 3.7 3.6 - 5.5 mmol/L    Chloride 107 96 - 112 mmol/L    Co2 23 20 - 33 mmol/L    Anion Gap 9.0 0.0 - 11.9    Glucose 83 65  - 99 mg/dL    Bun 5 (L) 8 - 22 mg/dL    Creatinine 0.50 0.50 - 1.40 mg/dL    Calcium 9.1 8.5 - 10.5 mg/dL    AST(SGOT) 17 12 - 45 U/L    ALT(SGPT) 12 2 - 50 U/L    Alkaline Phosphatase 134 (H) 30 - 99 U/L    Total Bilirubin 0.6 0.1 - 1.5 mg/dL    Albumin 3.4 3.2 - 4.9 g/dL    Total Protein 6.1 6.0 - 8.2 g/dL    Globulin 2.7 1.9 - 3.5 g/dL    A-G Ratio 1.3 g/dL   URIC ACID    Collection Time: 18 10:20 AM   Result Value Ref Range    Uric Acid 3.9 1.9 - 8.2 mg/dL   URINALYSIS    Collection Time: 18 10:20 AM   Result Value Ref Range    Color Yellow     Character Clear     Specific Gravity 1.006 <1.035    Ph 8.0 5.0 - 8.0    Glucose Negative Negative mg/dL    Ketones Negative Negative mg/dL    Protein Negative Negative mg/dL    Bilirubin Negative Negative    Urobilinogen, Urine 0.2 Negative    Nitrite Negative Negative    Leukocyte Esterase Trace (A) Negative    Occult Blood Negative Negative    Micro Urine Req Microscopic    PROTEIN/CREAT RATIO URINE    Collection Time: 18 10:20 AM   Result Value Ref Range    Total Protein, Urine 5.1 0.0 - 15.0 mg/dL    Creatinine, Random Urine 15.40 mg/dL    Protein Creatinine Ratio 331 (H) 10 - 107 mg/g   Hold Blood Bank Specimen (Not Tested)    Collection Time: 18 10:20 AM   Result Value Ref Range    Holding Tube - Bb DONE    URINE MICROSCOPIC (W/UA)    Collection Time: 18 10:20 AM   Result Value Ref Range    WBC 0-2 /hpf    RBC 0-2 /hpf    Bacteria Negative None /hpf    Epithelial Cells Negative /hpf    Hyaline Cast 0-2 /lpf   ESTIMATED GFR    Collection Time: 18 10:20 AM   Result Value Ref Range    GFR If African American >60 >60 mL/min/1.73 m 2    GFR If Non African American >60 >60 mL/min/1.73 m 2       Assessment:   IUP at 36.1/Gestational htn- pt s/p cytotec 25 mcg x 1 and now on pitocin. S/P AROM, expt . Pt with mild headache, will give tylenol 650 mg one time and monitor. Blood pressures stable, PIH labs normal except for protein  creatinine ratio of 331.Pt s/p betamethasone 12 mg im x 1. CPM. Expt .  GBBS positive- s/p 2 nd dose of Penicillin G. Pt afebrile    Fetal status: reassuring.           Ban Ghosh

## 2018-11-13 NOTE — CARE PLAN
Problem: Communication  Goal: The ability to communicate needs accurately and effectively will improve  Outcome: PROGRESSING AS EXPECTED  Patient voices needs appropriately

## 2018-11-13 NOTE — PROGRESS NOTES
POSTPARTUM DAY 0.25 (6 hrs PP)    Baby is in nursery, requiring a little 02.  No complaints.  Patient is doing well with infant care and lactation issues.  Patient is voiding well.    PE:    Afebrile  BP 130s-140s/60s-80s (no antihypertensives given PP)    Uterus involuting appropriately, nontender  Perineum:  No perineal complaints, so I didn't do specific perineal exam.  Calves nontender, Matt negative bilaterally.     LAB:  PP CBC later today    Results for KALIN ROWAN (MRN [id number]) as of 11/13/2018 05:41   11/12/2018 10:20   WBC 8.2   Hemoglobin 11.4 (L)   Hematocrit 35.2 (L)   Platelet Count 216        PLAN:  Postpartum care.  Lactation consult.  Patient desires discharge:  Tomorrow, if baby DCd tomorrow  .    If any SBP > 150 and/or DBP > 100, RNs have an order to start labetalol 100 mg Q12H.

## 2018-11-13 NOTE — LACTATION NOTE
Met with MOB to begin supplementation.  Per PAULINA Aragon infant was placed to MOB's breast earlier this morning and did not show hunger cues.  Latch was not achieved.  Due to LPI status, supplementation initiated now per [facility] [facility] supplementation guidelines.  A hand-out of these guidelines was provided to MOB along with instructions on use.  MOB stated AWHONN LPI hand-out was provided to her previously and stated had no questions.      MOB wanting supplementation with Similac formula.  Provided 10 ml for FOB to feed to infant.  Demonstrated to FOB on how to perform paced bottle feeding.  FOB able to perform successful return demonstration.    Pump settings reviewed with MOB and are: 80 CPM down to 60 after 1-2 minutes/suction set to comfort at 20%/pumps for 15-20 minutes.  MOB denied pain and rubbing of nipples with pump use.  MOB received 5 ml of colostrum from pumping and will feed EBM back to baby at the next feeding.    MOB verbalized understanding of all information provided to her and denied having any further questions at this time.  Encouraged MOB to call for lactation support as needed.

## 2018-11-14 VITALS
BODY MASS INDEX: 28.53 KG/M2 | HEIGHT: 63 IN | WEIGHT: 161 LBS | DIASTOLIC BLOOD PRESSURE: 85 MMHG | OXYGEN SATURATION: 98 % | SYSTOLIC BLOOD PRESSURE: 141 MMHG | RESPIRATION RATE: 18 BRPM | TEMPERATURE: 99.3 F | HEART RATE: 68 BPM

## 2018-11-14 PROCEDURE — 700102 HCHG RX REV CODE 250 W/ 637 OVERRIDE(OP): Performed by: OBSTETRICS & GYNECOLOGY

## 2018-11-14 PROCEDURE — A9270 NON-COVERED ITEM OR SERVICE: HCPCS | Performed by: OBSTETRICS & GYNECOLOGY

## 2018-11-14 RX ADMIN — IBUPROFEN 600 MG: 600 TABLET, FILM COATED ORAL at 05:53

## 2018-11-14 RX ADMIN — Medication 1 TABLET: at 05:53

## 2018-11-14 ASSESSMENT — EDINBURGH POSTNATAL DEPRESSION SCALE (EPDS)
I HAVE BLAMED MYSELF UNNECESSARILY WHEN THINGS WENT WRONG: NOT VERY OFTEN
I HAVE BEEN SO UNHAPPY THAT I HAVE BEEN CRYING: NO, NEVER
I HAVE FELT SCARED OR PANICKY FOR NO GOOD REASON: NO, NOT AT ALL
I HAVE BEEN ABLE TO LAUGH AND SEE THE FUNNY SIDE OF THINGS: AS MUCH AS I ALWAYS COULD
THINGS HAVE BEEN GETTING ON TOP OF ME: NO, I HAVE BEEN COPING AS WELL AS EVER
I HAVE FELT SAD OR MISERABLE: NO, NOT AT ALL
THE THOUGHT OF HARMING MYSELF HAS OCCURRED TO ME: NEVER
I HAVE BEEN SO UNHAPPY THAT I HAVE HAD DIFFICULTY SLEEPING: NOT AT ALL
I HAVE BEEN ANXIOUS OR WORRIED FOR NO GOOD REASON: HARDLY EVER
I HAVE LOOKED FORWARD WITH ENJOYMENT TO THINGS: AS MUCH AS I EVER DID

## 2018-11-14 ASSESSMENT — PAIN SCALES - GENERAL
PAINLEVEL_OUTOF10: 0
PAINLEVEL_OUTOF10: 5

## 2018-11-14 NOTE — CARE PLAN
Problem: Altered physiologic condition related to immediate post-delivery state and potential for bleeding/hemorrhage  Goal: Patient physiologically stable as evidenced by normal lochia, palpable uterine involution and vital signs within normal limits  Outcome: PROGRESSING AS EXPECTED  Fundus firm,lochia light.    Problem: Alteration in comfort related to episiotomy, vaginal repair and/or after birth pains  Goal: Patient verbalizes acceptable pain level  Outcome: PROGRESSING AS EXPECTED  Patient medicated with motrin 600 mg.for uterine cramps with good relief as verbalized by patient.States that she will call if she needed pain medication.Will medicate as needed.Ambulating and voiding well without difficulty.    Problem: Potential knowledge deficit related to lack of understanding of self and  care  Goal: Patient will verbalize understanding of self and infant care  Outcome: PROGRESSING AS EXPECTED  Discharge teachings given and verbalized understanding.Has luis for infant.

## 2018-11-14 NOTE — CARE PLAN
Problem: Potential for postpartum infection related to presence of episiotomy/vaginal tear and/or uterine contamination  Goal: Patient will be absent from signs and symptoms of infection  Outcome: PROGRESSING AS EXPECTED  Patient has no signs/symptoms of infection.  Vital signs stable. Ambulating without difficulty    Problem: Alteration in comfort related to episiotomy, vaginal repair and/or after birth pains  Goal: Patient verbalizes acceptable pain level  Outcome: PROGRESSING AS EXPECTED  Patient states pain control is adequate

## 2018-11-14 NOTE — DISCHARGE SUMMARY
Discharge Summary:      Kathleen Moe      Admit Date:   2018  Discharge Date:  2018     Admitting diagnosis:  Pregnancy/ gestational hypertension at 36.1  Indication for care in labor or delivery  Discharge Diagnosis: Status post vaginal, spontaneous.  Pregnancy Complications: gestational hypertension    Tubal Ligation:  no        History:  Past Medical History:   Diagnosis Date   • Depression      OB History    Para Term  AB Living   3 2 1     2   SAB TAB Ectopic Molar Multiple Live Births           0 1      # Outcome Date GA Lbr Roberto/2nd Weight Sex Delivery Anes PTL Lv   3 Para 18  / 00:10 2.835 kg (6 lb 4 oz) M Vag-Spont EPI Y ISRAEL   2 Term            1                     Patient has no known allergies.  Patient Active Problem List    Diagnosis Date Noted   • Acetaminophen overdose 2013        Hospital Course:   28 y.o. , now para 3, was admitted with the above mentioned diagnosis, underwent Induction of Labor, pt progressed to complete and had a vaginal, spontaneous delivery. Patient postpartum course was unremarkable, with progressive advancement in diet , ambulation and toleration of oral analgesia. Patient without complaints today and desires discharge.      Vitals:    18 0131 18 0400 18 0800 18   BP: 143/79 148/83 132/88 143/82   Pulse: 77 70 75 82   Resp:    Temp: 36.9 °C (98.4 °F) 36.6 °C (97.9 °F) 36.8 °C (98.2 °F) 37 °C (98.6 °F)   TempSrc:       SpO2: 99% 94% 93% 94%   Weight:       Height:           Current Facility-Administered Medications   Medication Dose   • LR infusion     • PRN oxytocin (PITOCIN) (20 Units/1000 mL) PRN for excessive uterine bleeding - See Admin Instr  125-999 mL/hr   • miSOPROStol (CYTOTEC) tablet 800 mcg  800 mcg   • docusate sodium (COLACE) capsule 100 mg  100 mg   • prenatal plus vitamin (STUARTNATAL 1+1) 27-1 MG tablet 1 Tab  1 Tab   • D5LR infusion     • ondansetron  (ZOFRAN ODT) dispertab 4 mg  4 mg    Or   • ondansetron (ZOFRAN) syringe/vial injection 4 mg  4 mg   • oxytocin (PITOCIN) infusion (for postpartum)   mL/hr   • ibuprofen (MOTRIN) tablet 600 mg  600 mg   • oxyCODONE-acetaminophen (PERCOCET) 5-325 MG per tablet 1 Tab  1 Tab   • oxyCODONE-acetaminophen (PERCOCET) 5-325 MG per tablet 2 Tab  2 Tab   • labetalol (NORMODYNE,TRANDATE) injection 20-80 mg  20-80 mg    Or   • hydrALAZINE (APRESOLINE) injection 5-10 mg  5-10 mg   • oxytocin (PITOCIN) 20 UNITS/1000ML LR (induction of labor)  0.5-20 kelli-units/min   • ropivacaine (NAROPIN) injection         Exam:  Breast Exam: negative  Abdomen: Abdomen soft, non-tender. BS normal. No masses,  No organomegaly  Fundus Non Tender: no  Perineum: perineum intact  Extremity: extremities, peripheral pulses and reflexes normal     Labs:  Recent Labs      11/12/18   1020  11/13/18   0814   WBC  8.2  15.5*   RBC  4.20  3.78*   HEMOGLOBIN  11.4*  10.3*   HEMATOCRIT  35.2*  31.9*   MCV  83.8  84.4   MCH  27.1  27.2   MCHC  32.4*  32.3*   RDW  44.7  45.5   PLATELETCT  216  193   MPV  12.2  12.3        Activity:   Discharge to home  Pelvic Rest x 6 weeks    Assessment:  normal postpartum course  Discharge Assessment: No areas of skin breakdown/redness; surgical incision intact/healing     Follow up: 6 weeks with Dr Ghosh   Discharge Meds:   No current outpatient prescriptions on file.     Otc: ibuprofen  Ban Ghosh M.D.

## 2018-11-14 NOTE — DISCHARGE INSTRUCTIONS
"POSTPARTUM DISCHARGE INSTRUCTIONS FOR MOM    YOB: 1990   Age: 28 y.o.               Admit Date: 11/12/2018     Discharge Date: 11/14/2018  Attending Doctor:  Ban Ghosh M.D.                  Allergies:  Patient has no known allergies.    Be sure to schedule a follow-up appointment with your primary care doctor or any specialists as instructed.     REASONS TO CALL YOUR OBSTETRICIAN:  1.   Persistent fever or shaking chills (Temperature higher than 100.4)  2.   Heavy bleeding (soaking more than 1 pad per hour); Passing clots  3.   Foul odor from vagina  4.   Mastitis (Breast infection; breast pain, chills, fever, redness)  5.   Urinary pain, burning or frequency  6.   Severe depression longer than 24 hours    HAND WASHING  · Prior to handling the baby.  · Before breastfeeding or bottle feeding baby.  · After using the bathroom or changing the baby's diaper.    VAGINAL CARE  · Nothing inside vagina for 6 weeks: no sexual intercourse, tampons or douching.  · Bleeding may continue for 2-4 weeks.  Amount may vary.    · Call your physician for heavy bleeding which means soaking more than 1 pad per hour    BIRTH CONTROL  · It is possible to become pregnant at any time after delivery and while breastfeeding.  · Plan to discuss a method of birth control with your physician at your follow up visit. visit.    DIET AND ELIMINATION  · Eating more fiber (bran cereal, fruits, and vegetables) and drinking plenty of fluids will help to avoid constipation.  · Urinary frequency after childbirth is normal.    POSTPARTUM BLUES  During the first few days after birth, you may experience a sense of the \"blues\" which may include impatience, irritability or even crying.  These feeling come and go quickly.  However, as many as 1 in 10 women experience emotional symptoms known as postpartum depression.    Postpartum depression:  May start as early as the second or third day after delivery or take several weeks or months to " "develop.  Symptoms of \"blues\" are present, but are more intense:  Crying spells; loss of appetite; feelings of hopelessness or loss of control; fear of touching the baby; over concern or no concern at all about the baby; little or no concern about your own appearance/caring for yourself; and/or inability to sleep or excessive sleeping.  Contact your physician if you are experiencing any of these symptoms.    Crisis Hotline:  · [place] Crisis Hotline:  4-261-DHPUCGL  Or 1-594.683.2771  · [place] Crisis Hotline:  1-861.197.7570  Or 831-579-3760    PREVENTING SHAKEN BABY:  If you are angry or stressed, PUT THE BABY IN THE CRIB, step away, take some deep breaths, and wait until you are calm to care for the baby.  DO NOT SHAKE THE BABY.  You are not alone, call a supporter for help.    · Crisis Call Center 24/7 crisis line 087-451-4361 or 1-528.480.3973  · You can also text them, text \"ANSWER\" to 457658    QUIT SMOKING/TOBACCO USE:  I understand the use of any tobacco products increases my chance of suffering from future heart disease and could cause other illnesses which may shorten my life. Quitting the use of tobacco products is the single most important thing I can do to improve my health. For further information on smoking / tobacco cessation call a Toll Free Quit Line at 1-412.226.5923 (*[employer] [place]) or 1-914.232.6126 (American Lung Association) or you can access the web based program at www.lungusa.org.    · [place] Tobacco Users Help Line:  (309) 754-8258       Toll Free: 1-874.738.8775  · Quit Tobacco Program [facility] (319)798-7207    DEPRESSION / SUICIDE RISK:  As you are discharged from this [facility] facility, it is important to learn how to keep safe from harming yourself.    Recognize the warning signs:  · Abrupt changes in personality, positive or negative- including increase in energy   · Giving away possessions  · Change in eating patterns- significant weight " changes-  positive or negative  · Change in sleeping patterns- unable to sleep or sleeping all the time   · Unwillingness or inability to communicate  · Depression  · Unusual sadness, discouragement and loneliness  · Talk of wanting to die  · Neglect of personal appearance   · Rebelliousness- reckless behavior  · Withdrawal from people/activities they love  · Confusion- inability to concentrate     If you or a loved one observes any of these behaviors or has concerns about self-harm, here's what you can do:  · Talk about it- your feelings and reasons for harming yourself  · Remove any means that you might use to hurt yourself (examples: pills, rope, extension cords, firearm)  · Get professional help from the community (Mental Health, Substance Abuse, psychological counseling)  · Do not be alone:Call your Safe Contact- someone whom you trust who will be there for you.  · Call your local CRISIS HOTLINE 741-5015 or 691-883-8041  · Call your local Children's Mobile Crisis Response Team [place] (570) 152-3414 or www.XOJET  · Call the toll free National Suicide Prevention Hotlines   · National Suicide Prevention Lifeline 984-405-LQJD (5753)  · National Hope Line Network 800-SUICIDE (282-0895)    DISCHARGE SURVEY:  Thank you for choosing [facility].  We hope we provided you with very good care.  You may be receiving a survey in the mail.  Please fill it out.  Your opinion is valuable to us.    ADDITIONAL EDUCATIONAL MATERIALS GIVEN TO PATIENT:        My signature on this form indicates that:  1.  I have reviewed and understand the above information  2.  My questions regarding this information have been answered to my satisfaction.  3.  I have formulated a plan with my discharge nurse to obtain my prescribed medication for home.

## 2020-03-15 ENCOUNTER — HOSPITAL ENCOUNTER (EMERGENCY)
Facility: MEDICAL CENTER | Age: 30
End: 2020-03-15
Attending: EMERGENCY MEDICINE
Payer: COMMERCIAL

## 2020-03-15 VITALS
BODY MASS INDEX: 23.24 KG/M2 | OXYGEN SATURATION: 97 % | HEIGHT: 63 IN | HEART RATE: 89 BPM | DIASTOLIC BLOOD PRESSURE: 76 MMHG | TEMPERATURE: 97.2 F | SYSTOLIC BLOOD PRESSURE: 116 MMHG | WEIGHT: 131.17 LBS | RESPIRATION RATE: 15 BRPM

## 2020-03-15 DIAGNOSIS — S16.1XXA STRAIN OF NECK MUSCLE, INITIAL ENCOUNTER: ICD-10-CM
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
## 2020-03-15 PROCEDURE — 99284 EMERGENCY DEPT VISIT MOD MDM: CPT

## 2020-03-15 ASSESSMENT — ENCOUNTER SYMPTOMS
ABDOMINAL PAIN: 0
NECK PAIN: 1
SHORTNESS OF BREATH: 0
BLURRED VISION: 0
MYALGIAS: 0
HEADACHES: 0

## 2020-03-15 ASSESSMENT — FIBROSIS 4 INDEX: FIB4 SCORE: 0.79
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
## 2020-03-16 NOTE — ED NOTES
Pt discharged home as ordered by erp. Pt instructed to follow up with their PCP and return here as need.Pt verbalized understanding and left ambulating independently

## 2021-07-22 ENCOUNTER — HOSPITAL ENCOUNTER (OUTPATIENT)
Facility: MEDICAL CENTER | Age: 31
End: 2021-07-22
Attending: PHYSICIAN ASSISTANT
Payer: COMMERCIAL

## 2021-07-22 ENCOUNTER — OFFICE VISIT (OUTPATIENT)
Dept: URGENT CARE | Facility: CLINIC | Age: 31
End: 2021-07-22
Payer: COMMERCIAL

## 2021-07-22 VITALS
HEIGHT: 64 IN | RESPIRATION RATE: 16 BRPM | TEMPERATURE: 97.1 F | BODY MASS INDEX: 23.05 KG/M2 | WEIGHT: 135 LBS | OXYGEN SATURATION: 99 % | DIASTOLIC BLOOD PRESSURE: 68 MMHG | HEART RATE: 72 BPM | SYSTOLIC BLOOD PRESSURE: 120 MMHG

## 2021-07-22 DIAGNOSIS — J06.9 UPPER RESPIRATORY TRACT INFECTION, UNSPECIFIED TYPE: ICD-10-CM

## 2021-07-22 DIAGNOSIS — J34.89 SINUS PAIN: ICD-10-CM

## 2021-07-22 LAB — COVID ORDER STATUS COVID19: NORMAL

## 2021-07-22 PROCEDURE — U0003 INFECTIOUS AGENT DETECTION BY NUCLEIC ACID (DNA OR RNA); SEVERE ACUTE RESPIRATORY SYNDROME CORONAVIRUS 2 (SARS-COV-2) (CORONAVIRUS DISEASE [COVID-19]), AMPLIFIED PROBE TECHNIQUE, MAKING USE OF HIGH THROUGHPUT TECHNOLOGIES AS DESCRIBED BY CMS-2020-01-R: HCPCS

## 2021-07-22 PROCEDURE — U0005 INFEC AGEN DETEC AMPLI PROBE: HCPCS

## 2021-07-22 PROCEDURE — 99203 OFFICE O/P NEW LOW 30 MIN: CPT | Performed by: PHYSICIAN ASSISTANT

## 2021-07-22 RX ORDER — DEXTROMETHORPHAN HYDROBROMIDE AND PROMETHAZINE HYDROCHLORIDE 15; 6.25 MG/5ML; MG/5ML
5 SYRUP ORAL 4 TIMES DAILY PRN
Qty: 100 ML | Refills: 0 | Status: SHIPPED | OUTPATIENT
Start: 2021-07-22 | End: 2021-07-27

## 2021-07-22 RX ORDER — FLUTICASONE PROPIONATE 50 MCG
1 SPRAY, SUSPENSION (ML) NASAL DAILY
Qty: 16 G | Refills: 0 | Status: SHIPPED | OUTPATIENT
Start: 2021-07-22

## 2021-07-22 RX ORDER — METOPROLOL TARTRATE 100 MG/1
100 TABLET ORAL DAILY
COMMUNITY

## 2021-07-22 ASSESSMENT — ENCOUNTER SYMPTOMS
COUGH: 1
FEVER: 0
SINUS PAIN: 1
EYE REDNESS: 0
NECK PAIN: 0
VOMITING: 0
SINUS PRESSURE: 1
SORE THROAT: 1
HEADACHES: 1
MYALGIAS: 0
DIARRHEA: 0
DIZZINESS: 0
WHEEZING: 0
EYE DISCHARGE: 0
CHILLS: 0

## 2021-07-22 NOTE — PROGRESS NOTES
"Subjective:      Kathleen Moe is a 31 y.o. female who presents with Sinus Problem (x 1 week, sinus pressure, stuffy nose)            Patient is a 31-year-old female who presents to urgent care with sinus pain and pressure, productive cough and runny nose for the last 6 to 7 days.  Denies any fevers, body aches.  Patient does report noted headache.  She does report an ill exposure at work last week who had Covid of which she tested negative last week for after exposure.  She denies any COVID-19 vaccinations.    Sinus Problem  This is a new problem. The current episode started in the past 7 days. There has been no fever. Associated symptoms include congestion, coughing, headaches, sinus pressure and a sore throat. Pertinent negatives include no chills, ear pain or neck pain.       Review of Systems   Constitutional: Positive for malaise/fatigue. Negative for chills and fever.   HENT: Positive for congestion, sinus pressure, sinus pain and sore throat. Negative for ear discharge and ear pain.         Pos. For ear pressure     Eyes: Negative for discharge and redness.   Respiratory: Positive for cough. Negative for wheezing.    Cardiovascular: Negative for chest pain and leg swelling.   Gastrointestinal: Negative for diarrhea and vomiting.   Genitourinary: Negative for dysuria.   Musculoskeletal: Negative for myalgias and neck pain.   Skin: Negative for itching and rash.   Neurological: Positive for headaches. Negative for dizziness.   All other systems reviewed and are negative.         Objective:     /68 (BP Location: Right arm, Patient Position: Sitting, BP Cuff Size: Adult)   Pulse 72   Temp 36.2 °C (97.1 °F) (Temporal)   Resp 16   Ht 1.626 m (5' 4\")   Wt 61.2 kg (135 lb)   SpO2 99%   BMI 23.17 kg/m²    PMH:  has a past medical history of Depression.  MEDS: Reviewed .   ALLERGIES: No Known Allergies  SURGHX: History reviewed. No pertinent surgical history.  SOCHX:  reports that she has never " smoked. She has never used smokeless tobacco. She reports that she does not drink alcohol and does not use drugs.  FH: Family history was reviewed, no pertinent findings to report    Physical Exam  Vitals reviewed.   Constitutional:       Appearance: Normal appearance. She is well-developed.   HENT:      Head: Normocephalic and atraumatic.      Ears:      Comments: Bilateral clear middle ear effusions.     Nose:      Comments: Rhinorrhea noted.     Mouth/Throat:      Comments: Posterior oropharynx is erythematous, positive postnasal drainage.  No evidence of exudate.  Eyes:      Conjunctiva/sclera: Conjunctivae normal.      Pupils: Pupils are equal, round, and reactive to light.   Cardiovascular:      Rate and Rhythm: Normal rate and regular rhythm.      Heart sounds: No murmur heard.     Pulmonary:      Effort: Pulmonary effort is normal. No respiratory distress.      Breath sounds: Normal breath sounds.   Musculoskeletal:         General: Normal range of motion.      Cervical back: Normal range of motion and neck supple.      Right lower leg: No edema.      Left lower leg: No edema.   Lymphadenopathy:      Cervical: No cervical adenopathy.   Skin:     General: Skin is warm.      Findings: No rash.   Neurological:      Mental Status: She is alert and oriented to person, place, and time.   Psychiatric:         Mood and Affect: Mood normal.         Behavior: Behavior normal.         Thought Content: Thought content normal.                        Assessment/Plan:        1. Upper respiratory tract infection, unspecified type  - fluticasone (FLONASE) 50 MCG/ACT nasal spray; Administer 1 Spray into affected nostril(S) every day.  Dispense: 16 g; Refill: 0  - promethazine-dextromethorphan (PROMETHAZINE-DM) 6.25-15 MG/5ML syrup; Take 5 mL by mouth 4 times a day as needed for Cough for up to 5 days.  Dispense: 100 mL; Refill: 0  - COVID/SARS CoV-2 PCR; Future    2. Sinus pain    Appropriate PPE worn at all times by  provider.   Pt. Had face mask on throughout entirety of the visit other than oropharyngeal examination today.       Testing performed for COVID-19.    Patient currently without indication of need for higher level of care.     Problems results will also be released to patient/guardian in MyChart or called to the patient/guardian directly.  Encouraged mask use, frequent handwashing, wiping down hard surfaces, etc.    Patient and/or guardian given precautionary s/sx that mandate immediate follow up and evaluation in the ED. Advised of risks of not doing so.  Side effects of OTC or prescribed medications discussed.   DDX, Supportive care, and indications for immediate follow-up discussed with patient.    Instructed to return to clinic or nearest emergency department if we are not available for any change in condition, further concerns, or worsening of symptoms.    The patient and/or guardian demonstrated a good understanding and agreed with the treatment plan.    Please note that this dictation was created using voice recognition software. I have made every reasonable attempt to correct obvious errors, but I expect that there are errors of grammar and possibly content that I did not discover before finalizing the note.

## 2021-07-23 LAB
SARS-COV-2 RNA RESP QL NAA+PROBE: NOTDETECTED
SPECIMEN SOURCE: NORMAL